# Patient Record
(demographics unavailable — no encounter records)

---

## 2024-10-30 NOTE — REASON FOR VISIT
[Follow-Up: _____] : a [unfilled] follow-up visit [FreeTextEntry1] : Ms. Markham is a 66-year-old right-handed female with a past medical history of HTN. In 5/2022 she tripped and fell while walking. She denies LOC but she did hit the side of her head and eye. She went to [facility]. A CTH was negative for bleed but incidentally revealed a left posterior frontal convexity meningioma. She was advised to see a neurologist. She consulted with Dr. Diane who advised her consult with a neurosurgeon_ Dr. Smyth.  Today she reports feeling well, she denies any neurological impairments i.e. headaches, altered personality/ mood, movement impairment.  Neuro Exam: Intact.

## 2024-10-30 NOTE — REASON FOR VISIT
[Follow-Up: _____] : a [unfilled] follow-up visit [FreeTextEntry1] : Ms. Markham is a 66-year-old right-handed female with a past medical history of HTN. In 5/2022 she tripped and fell while walking. She denies LOC but she did hit the side of her head and eye. She went to [facility]. A CTH was negative for bleed but incidentally revealed a left posterior frontal convexity meningioma. She was advised to see a neurologist. She consulted with Dr. Diane who advised her consult with a neurosurgeon_ Dr. Smyth.  Today she reports feeling well, she denies any neurological impairments i.e. headaches, altered personality/ mood, movement impairment.  Neuro Exam: Intact.

## 2024-10-30 NOTE — ASSESSMENT
[FreeTextEntry1] : Ms. Markham is a 66-year-old right-handed female with a past medical history of HTN. In 5/2022 she tripped and fell while walking. She denies LOC but she did hit the side of her head and eye. She went to [facility]. A CTH was negative for bleed but incidentally revealed a left posterior frontal convexity meningioma.  Discussion: MRI looks stable Repeat MRI w/o contrast in two years, then 2.5, then 3 years. Contact office with any questions or concerns as needed   .IPorfirio evaluated the patient with the nurse practitioner Joann Barthelemy and established the plan of care. I personally discuss this patient with the nurse practitioner at the time of the visit. I agree with the assessment and plan as written, unless noted below.

## 2024-10-30 NOTE — ASSESSMENT
[FreeTextEntry1] : Ms. Markham is a 66-year-old right-handed female with a past medical history of HTN. In 5/2022 she tripped and fell while walking. She denies LOC but she did hit the side of her head and eye. She went to [facility]. A CTH was negative for bleed but incidentally revealed a left posterior frontal convexity meningioma.  Discussion: MRI looks stable Repeat MRI w/o contrast in two years, then 2.5, then 3 years. Contact office with any questions or concerns as needed   .IPorfirio evaluated the patient with the nurse practitioner Joann Barthelemy and established the plan of care. I personally discuss this patient with the nurse practitioner at the time of the visit. I agree with the assessment and plan as written, unless noted below.

## 2024-10-30 NOTE — PHYSICAL EXAM
[General Appearance - Alert] : alert [General Appearance - In No Acute Distress] : in no acute distress [General Appearance - Well Nourished] : well nourished [Oriented To Time, Place, And Person] : oriented to person, place, and time [Person] : oriented to person [Place] : oriented to place [Time] : oriented to time [Motor Strength] : muscle strength was normal in all four extremities [Balance] : balance was intact [PERRL With Normal Accommodation] : pupils were equal in size, round, reactive to light, with normal accommodation [Extraocular Movements] : extraocular movements were intact [Abnormal Walk] : normal gait [Involuntary Movements] : no involuntary movements were seen [Motor Tone] : muscle strength and tone were normal [Skin Color & Pigmentation] : normal skin color and pigmentation [Romberg's Sign] : Romberg's sign was negtive

## 2024-12-04 NOTE — PROCEDURE
[FreeTextEntry1] : Full PFT reveals normal flows; FEV1 was 2.08L which is 88% of predicted; normal lung volumes; normal diffusion at 18.47, which is 92% of predicted; normal flow volume loop. PFTs were performed to evaluate for sarcoidosis  FENO was 30; a normal value being less than 25 Fractional exhaled nitric oxide (FENO) is regarded as a simple, noninvasive method for assessing eosinophilic airway inflammation. Produced by a variety of cells within the lung, nitric oxide (NO) concentrations are generally low in healthy individuals. However, high concentrations of NO appear to be involved in nonspecific host defense mechanisms and chronic inflammatory diseases such as asthma. The American Thoracic Society (ATS) therefore has recommended using FENO to aid in the diagnosis and monitoring of eosinophilic airway inflammation and asthma, and for identifying steroid responsive individuals whose chronic respiratory symptoms may be caused by airway inflammation.

## 2024-12-04 NOTE — REASON FOR VISIT
[Follow-Up] : a follow-up visit [TextBox_44] : abnormal chest X-ray/ CT abdomen pelvis (lower chest) c/w Sarcoidosis, (+) KATIUSKA, GERD

## 2024-12-04 NOTE — ADDENDUM
[FreeTextEntry1] : Documented by Margaret Hyman acting as a scribe for Dr. Sam Chow on 12/04/2024. All medical record entries made by the Scribe were at my, Dr. Sam Chow's, direction and personally dictated by me on 12/04/2024. I have reviewed the chart and agree that the record accurately reflects my personal performance of the history, physical exam, assessment and plan. I have also personally directed, reviewed, and agree with the discharge instructions.    (2) assistive person

## 2024-12-04 NOTE — HISTORY OF PRESENT ILLNESS
[TextBox_4] : Ms. BELCHER is a 67 year old female presenting to the office today for a follow-up pulmonary evaluation. Her chief complaint is-  -she notes feeling generally well  -she notes exercising (weightlifting, lunging, stretching; 30 minutes of walking at the end) -she notes energy levels are high -she denies taking daytime naps  -she denies heartburn/reflux/sour taste in the mouth -she notes vision is stable -she notes lumbago -she notes good quality of sleep and a regular sleep schedule -she notes sleeping for 7.5 hours  -she denies palpitations or headaches -she notes balance is stable  -she notes weight is stable  -she denies taking any new medications, vitamins, or supplements. She's on Losartan, Amlodipine, Collagen, vitamin D -she notes she had a physical 10/2024, her first one since 2018. Her bone density was normal, which she found surprising due to her mother's osteoporosis and sister's osteopenia. -she notes her cholesterol was normal -she notes her PCP is Dr. Holli Chairez ([facility]) -she notes she's concerned about her herniated disc, though she doesn't feel affected by it at this time. She's worried it may affect her in the future. She's seen several surgeons, who said she shouldn't do surgery if she's not bothered by it. -she notes s/p brain MRI 10/2024 with Dr. Smyth because she fell in 2022. She was found to have a meningioma in 6/2022. Dr. Tran recommended she see Dr. Smyth at that time -she notes no changes were seen in her brain scan -she denies getting the Shingles vaccine  -she denies any headaches, nausea, emesis, fever, chills, sweats, chest pain, chest pressure, coughing, wheezing, palpitations, diarrhea, constipation, dysphagia, vertigo, leg swelling, itchy eyes, itchy ears, heartburn, reflux, or sour taste in the mouth.

## 2024-12-04 NOTE — ASSESSMENT
[FreeTextEntry1] : Ms. BELCHER is a 67 year old female with a prior history of gallstones, diverticulitis (10/2020), hypertension, cholecystitis, GERD, ? esophageal spasms, meningioma, COVID-19 3/2020, 5/2022- abnormal chest X-ray/ CT abdomen pelvis (lower chest), (+) KATIUSKA, GERD- CT c/w sarcoidosis- s/p gallbladder surgery 3/2023, cholecystitis post - stable except for Back Pain c/w Arthritis (still)  - Pulmonary  - Abnormal CT  - Sarcoidosis - Cardiac  Problem 1: Abnormal CT (most consistent with inflammation: likely represents sarcoidosis/hypersensitivity pneumonitis/ DORMAN/? old TB / GRZEGORZ / IVORY - less likely to be consistent with malignancy due to the fact findings appeared to be stable for at least two years - s/p dedicated HRCT c/w sarcoidosis - s/p ESR, ACE level, HP panel, QuantiFERON Gold (wnl) -s/p CT 1/2024- next 7/2025 -recommended ophthalmologic evaluation - CAT scans are the only radiological modality to identify abnormalities w/in the lungs with regards to nodules/masses/lymph nodes. Risks, benefits were reviewed in detail. The guidelines for abnormalities include follow up CT scans at various intervals which could range from 6 weeks to 1 year intervals. If there is a change for the worse then consideration for a biopsy will be considered if you are a candidate. Second opinion evaluation with a thoracic surgeon or an interventional radiologist could be offered.  Problem 2: GERD (Oleg Adam) -continue Pepcid 40 mg QHS - recommended Reflux Gourmet -Rule of 2s: avoid eating too much, eating too late, eating too spicy, eating two hours before bed. - Things to avoid including overeating, spicy foods, tight clothing, eating within two hours of bed, this list is not all inclusive. - For treatments of reflux, possible options discussed including diet control, H2 blockers, PPIs, as well as coating motility agents discussed as treatment options. Timing of meals and proximity of last meal to sleep were discussed. If symptoms persist, a formal gastrointestinal evaluation is needed.  problem 2A: (+) gB Dz: - s/p Gallbladder surgery 3/2023, Dr. Dempsey et al.  Problem 3: (+)KATIUSKA -mild (Snoring) - s/p home sleep study -recommended DD - Sleep apnea is associated with adverse clinical consequences which an affect most organ systems. Cardiovascular disease risk includes arrhythmias, atrial fibrillation, hypertension, coronary artery disease, and stroke. Metabolic disorders include diabetes type 2, non-alcoholic fatty liver disease. Mood disorder especially depression; and cognitive decline especially in the elderly. Associations with chronic reflux/Clemons's esophagus some but not all inclusive. -Reasons include arousal consistent with hypopnea; respiratory events most prominent in REM sleep or supine position; therefore sleep staging and body position are important for accurate diagnosis and estimation of AHI.  Problem 4: COVID-19 education - s/p COVID-19 vax x3 - COVID-19 infection 3/2020,5/2022  Problem 5: Cardiac -Recommended cardiac follow up evaluation with cardiologist if needed  Problem 6: Health maintenance -recommended at least 100 g of protein per day -Back: recommended PEMF mat  -PCP: Dr. Holli Chairez ([facility]) -s/p flu shot 2024 -recommended strep pneumonia vaccines: Prevnar-13 vaccine, followed by Pneumo vaccine 23 one year following (completed) -recommended early intervention for URIs -recommended regular osteoporosis evaluations-recommended early dermatological evaluations -recommended after the age of 50 to the age of 70, colonoscopy every 5 years  F/P in 4-6 months pt is encouraged to call or fax the office with any questions or concerns. Explained to the pt in full detail with demonstrations how to use the inhalers and inhaler hygiene. -Education provided to the PT regarding their visit and conditions.